# Patient Record
Sex: MALE | Race: WHITE
[De-identification: names, ages, dates, MRNs, and addresses within clinical notes are randomized per-mention and may not be internally consistent; named-entity substitution may affect disease eponyms.]

---

## 2017-02-02 ENCOUNTER — HOSPITAL ENCOUNTER (OUTPATIENT)
Dept: HOSPITAL 45 - C.LAB1850 | Age: 72
Discharge: HOME | End: 2017-02-02
Attending: INTERNAL MEDICINE
Payer: COMMERCIAL

## 2017-02-02 DIAGNOSIS — E78.00: Primary | ICD-10-CM

## 2017-02-02 LAB
ALT SERPL-CCNC: 63 U/L (ref 12–78)
ANION GAP SERPL CALC-SCNC: 9 MMOL/L (ref 3–11)
AST SERPL-CCNC: 33 U/L (ref 15–37)
BASOPHILS # BLD: 0.02 K/UL (ref 0–0.2)
BASOPHILS NFR BLD: 0.4 %
BUN SERPL-MCNC: 19 MG/DL (ref 7–18)
BUN/CREAT SERPL: 17.2 (ref 10–20)
CALCIUM SERPL-MCNC: 8.5 MG/DL (ref 8.5–10.1)
CHLORIDE SERPL-SCNC: 103 MMOL/L (ref 98–107)
CHOLEST/HDLC SERPL: 2.4 {RATIO}
CO2 SERPL-SCNC: 30 MMOL/L (ref 21–32)
COMPLETE: YES
CREAT SERPL-MCNC: 1.1 MG/DL (ref 0.6–1.4)
EOSINOPHIL NFR BLD AUTO: 220 K/UL (ref 130–400)
GLUCOSE SERPL-MCNC: 96 MG/DL (ref 70–99)
GLUCOSE UR QL: 53 MG/DL
HCT VFR BLD CALC: 44.9 % (ref 42–52)
IG%: 0 %
IMM GRANULOCYTES NFR BLD AUTO: 29.9 %
KETONES UR QL STRIP: 61 MG/DL
LYMPHOCYTES # BLD: 1.55 K/UL (ref 1.2–3.4)
MCH RBC QN AUTO: 31.5 PG (ref 25–34)
MCHC RBC AUTO-ENTMCNC: 36.1 G/DL (ref 32–36)
MCV RBC AUTO: 87.2 FL (ref 80–100)
MONOCYTES NFR BLD: 11.9 %
NEUTROPHILS # BLD AUTO: 2.5 %
NEUTROPHILS NFR BLD AUTO: 55.3 %
NITRITE UR QL STRIP: 57 MG/DL (ref 0–150)
PH UR: 125 MG/DL (ref 0–200)
PMV BLD AUTO: 10.7 FL (ref 7.4–10.4)
POTASSIUM SERPL-SCNC: 3.6 MMOL/L (ref 3.5–5.1)
RBC # BLD AUTO: 5.15 M/UL (ref 4.7–6.1)
SODIUM SERPL-SCNC: 142 MMOL/L (ref 136–145)
VERY LOW DENSITY LIPOPROT CALC: 11 MG/DL
WBC # BLD AUTO: 5.19 K/UL (ref 4.8–10.8)

## 2017-04-17 ENCOUNTER — HOSPITAL ENCOUNTER (OUTPATIENT)
Dept: HOSPITAL 45 - C.GI | Age: 72
Discharge: HOME | End: 2017-04-17
Attending: INTERNAL MEDICINE
Payer: COMMERCIAL

## 2017-04-17 VITALS
BODY MASS INDEX: 26.54 KG/M2 | HEIGHT: 70 IN | BODY MASS INDEX: 26.54 KG/M2 | BODY MASS INDEX: 26.54 KG/M2 | WEIGHT: 185.39 LBS | WEIGHT: 185.39 LBS | HEIGHT: 70 IN

## 2017-04-17 VITALS — SYSTOLIC BLOOD PRESSURE: 144 MMHG | DIASTOLIC BLOOD PRESSURE: 75 MMHG | HEART RATE: 64 BPM | OXYGEN SATURATION: 99 %

## 2017-04-17 DIAGNOSIS — K64.8: ICD-10-CM

## 2017-04-17 DIAGNOSIS — K57.30: ICD-10-CM

## 2017-04-17 DIAGNOSIS — Z86.010: ICD-10-CM

## 2017-04-17 DIAGNOSIS — E78.00: ICD-10-CM

## 2017-04-17 DIAGNOSIS — Z12.11: Primary | ICD-10-CM

## 2017-04-17 DIAGNOSIS — I10: ICD-10-CM

## 2017-04-17 NOTE — DISCHARGE INSTRUCTIONS
Endoscopy Patient Instructions


Date / Procedure(s) Performed


Apr 17, 2017.


Colonoscopy





Allergy Information


Coded Allergies:  


     Doxycycline (Verified  Allergy, Unknown, NAUSEA, 3/27/17)


     Acetaminophen (Verified  Adverse Reaction, Unknown, nausea, 3/27/17)


     Hydrocodone (Verified  Adverse Reaction, Unknown, nausea, 3/27/17)





Discharge Date / Findings


Apr 17, 2017.


Diverticulosis


Internal hemorrhoids





Medication Instructions


OK to resume all medications today as prescribed.


 Reported Home Medications








 Medications  Dose


 Route/Sig


 Max Daily Dose Days Date Category


 


 Citrucel


  (Methylcellulose


  (Laxative)) 500


 Mg Tab  1 Tab


 PO QAM


    3/27/17 Reported


 


 Testosterone


 Enanthate 200


 Mg/Ml Inj  0.4 Ml


 IM [id number]


    3/27/17 Reported


 


 Viagra


  (Sildenafil


 Citrate) 50 Mg Tab  50 Mg


 PO PRN


    2/18/16 Reported


 


 Lipitor


  (Atorvastatin


 Calcium) 20 Mg Tab  20 Mg


 PO HS


    2/18/16 Reported


 


 Flomax


  (Tamsulosin Hcl)


 0.4 Mg Cap  0.4 Mg


 PO HS


    2/18/16 Reported


 


 Lozol


  (Indapamide) 1.25


 Mg Tab  1.25 Mg


 PO QAM


    2/18/16 Reported


 


 Valium (Diazepam)


 5 Mg Tab  5 Mg


 PO HS PRN


    1/23/13 Reported











Provider Instructions





Activity Restrictions





-  No exercising or heavy lifting for 24 hours. 


-  Do not drink alcohol the day of the procedure.


-  Do not drive a car or operate machinery until the day after the procedure.


-  Do not make any important decisions or sign important papers in 24 hours 

after the procedure.





Following Day:





-  Return to full activity which may include returning to work/school.





Diet





Start your diet with liquids and light foods (jello, soup, juice, toast).  Then 

eat your usual diet if not nauseated.





Treatment For Common After Affects





For mild abdominal pain, bloating, or excessive gas:





-  Rest


-  Eat lightly


-  Lie on right side





Follow-Up Information


Follow-up with Dr Romero as scheduled





Anesthesia Information





What You Should Know





You have had a procedure that required some medicine to reduce anxiety and 

discomfort. This treatment is called moderate sedation.  


After receiving the treatment, you may be sleepy, but you will be able to 

breathe on your own.  The effects of the treatment may last for several hours.








Follow these instructions along with Activity/Diet recommendations noted above:





*  Do NOT do anything where dizziness or clumsiness would be dangerous.





*  Rest quietly at home today, then you can be up and about tomorrow.





*  Have a responsible person stay with you the rest of today.





*  You may have had an I.V. today.  If so, you may take the dressing off later 

today.





Recommendations


 


Call your doctor if:





*  Trouble breathing 





*  Continuous vomiting for more than 24 hours





*  Temperature above 101 degrees





*  Severe abdominal pain or bloating





*  Pain not relieved by pain medicine ordered





*  There is increased drainage or redness from any incision





*  A large amount of rectal bleeding greater than 2-3 tablespoons. 


   (If you had a polyp/s removed or have hemorrhoids, a small amount of blood -


    from the rectum is to be expected.)





*  You have any unanswered questions or concerns.





IN THE EVENT OF A SERIOUS EMERGENCY, GO TO THE NEAREST EMERGENCY ROOM





       Your discharge instructions were prepared by provider Barber Byrd.


 Patient Instructions Signature Page








Jett Santamaria 











Patient (or Guardian) Signature/Date:____________________________________ I 

have read and understand the instructions given to me by my caregivers.








Caregiver/RN/Doctor Signature/Date:____________________________________








The above-named patient and/or guardian has received patient instructions on 

this date.


























+  Original Patient Signature Page (only) stays with chart.  Please make copy 

for patient.

## 2017-04-17 NOTE — ANESTHESIOLOGY PROGRESS NOTE
Anesthesia Post Op Note


Date & Time


Apr 17, 2017 at 12:25





Vital Signs


Pain Intensity:  0





 Vital Signs Past 12 Hours








  Date Time  Temp Pulse Resp B/P Pulse Ox O2 Delivery O2 Flow Rate FiO2


 


4/17/17 12:08  64 20 144/75 99 Room Air  


 


4/17/17 11:50  59 20 112/55 99 Room Air  


 


4/17/17 11:37  65 20 127/58 97 Room Air  


 


4/17/17 10:43 36.6 61 20 150/82 97 Room Air  











Notes


Mental Status:  alert / awake / arousable, participated in evaluation


Pt Amnestic to Procedure:  Yes


Nausea / Vomiting:  adequately controlled


Pain:  adequately controlled


Airway Patency, RR, SpO2:  stable & adequate


BP & HR:  stable & adequate


Hydration State:  stable & adequate


Anesthetic Complications:  no major complications apparent

## 2017-04-17 NOTE — GI REPORT
Procedure Date: 4/17/2017 10:55 AM

THIS REPORT HAS BEEN AMENDED

Addendum Number: 1   Addendum Date: 4/17/2017 12:04:48 PM

     No specimens were obtained on this exam, and therefore, no pathology is 

     pending.

     Repeat colonoscopy in 5 years.

Procedure:            Colonoscopy

Indications:          High risk colon cancer surveillance: Personal history 

                      of colonic polyps

Medicines:            Monitored Anesthesia Care

Complications:        No immediate complications.

Estimated Blood Loss: Estimated blood loss: none.

Procedure:            Pre-Anesthesia Assessment:

                      - Prior to the procedure, a History and Physical was 

                      performed, and patient medications and allergies were 

                      reviewed. The patient's tolerance of previous 

                      anesthesia was also reviewed. The risks and benefits of 

                      the procedure and the sedation options and risks were 

                      discussed with the patient. All questions were 

                      answered, and informed consent was obtained. Prior 

                      Anticoagulants: The patient has taken no previous 

                      anticoagulant or antiplatelet agents. ASA Grade 

                      Assessment: II - A patient with mild systemic disease. 

                      After reviewing the risks and benefits, the patient was 

                      deemed in satisfactory condition to undergo the 

                      procedure.

                      After I obtained informed consent, the scope was passed 

                      under direct vision. Throughout the procedure, the 

                      patient's blood pressure, pulse, and oxygen saturations 

                      were monitored continuously. The scope was introduced 

                      through the anus and advanced to the terminal ileum. 

                      The colonoscopy was performed without difficulty. The 

                      patient tolerated the procedure well. The quality of 

                      the bowel preparation was good. The terminal ileum, 

                      ileocecal valve, appendiceal orifice, and rectum were 

                      photographed.

Findings:

     Multiple small-mouthed diverticula were found in the sigmoid colon.

     Non-bleeding internal hemorrhoids were found during retroflexion. The 

     hemorrhoids were small.

Impression:           - Diverticulosis in the sigmoid colon.

                      - Non-bleeding internal hemorrhoids.

                      - No specimens collected.

Recommendation:       - Resume previous diet.

                      - Continue present medications.

                      - Repeat colonoscopy for surveillance based on 

                      pathology results.

                      - Return to primary care physician as previously 

                      scheduled.

Barber Byrd DO

4/17/2017 11:33:18 AM

This report has been signed electronically.

Note Initiated On: 4/17/2017 10:55 AM

     I attest to the content of the Intraoperative Record and orders 

     documented therein, exceptions below

Barber PELAYOCaleb Byrd, DO

4/17/2017 12:05:22 PM

This report has been signed electronically.

## 2017-04-17 NOTE — ENDO HISTORY AND PHYSICAL
History & Physical


Date of Service:


Apr 17, 2017.


Chief Complaint:


Hx of polyps


Referring Physician:


Dr Romero


History of Present Illness


72 yo CM who presents for colonoscopy secondary to history of colon polyps.





Past Medical History


Arthritis, Male Genitourinary Prob., Reflux, Cancer, High Cholesterol, 

Hypertension





Past Surgical History


Hx Cardiac Surgery:  No


Hx Internal Defibrillator:  No


Hx Pacemaker:  No


Hx Abdominal Surgery:  Yes (SMALL BOWEL OBSTRUCTION REPAIR, APPY, HERNIA REPAIR)


Hx of Implantable Prosthesis:  No


Hx Post-Op Nausea and Vomiting:  No


Hx Cancer Surgery:  No


Hx Thoracic Surgery:  No


Hx Orthopedic:  Yes (LT SHOULDER SCOPE)


Hx Urinary Tract Surgery:  No





Family History


None





Social History


Smoking Status:  Never Smoker


Hx Substance Use:  No


Hx Alcohol Use:  Yes (SOCIAL)





Allergies


Coded Allergies:  


     Doxycycline (Verified  Allergy, Unknown, NAUSEA, 3/27/17)


     Acetaminophen (Verified  Adverse Reaction, Unknown, nausea, 3/27/17)


     Hydrocodone (Verified  Adverse Reaction, Unknown, nausea, 3/27/17)





Current Medications





 Reported Home Medications








 Medications  Dose


 Route/Sig


 Max Daily Dose Days Date Category


 


 Citrucel


  (Methylcellulose


  (Laxative)) 500


 Mg Tab  1 Tab


 PO QAM


    3/27/17 Reported


 


 Testosterone


 Enanthate 200


 Mg/Ml Inj  0.4 Ml


 IM [id number]


    3/27/17 Reported


 


 Viagra


  (Sildenafil


 Citrate) 50 Mg Tab  50 Mg


 PO PRN


    2/18/16 Reported


 


 Lipitor


  (Atorvastatin


 Calcium) 20 Mg Tab  20 Mg


 PO HS


    2/18/16 Reported


 


 Flomax


  (Tamsulosin Hcl)


 0.4 Mg Cap  0.4 Mg


 PO HS


    2/18/16 Reported


 


 Lozol


  (Indapamide) 1.25


 Mg Tab  1.25 Mg


 PO QAM


    2/18/16 Reported


 


 Valium (Diazepam)


 5 Mg Tab  5 Mg


 PO HS PRN


    1/23/13 Reported











Vital Signs


Weight (Kilograms):  84.09


Height (Feet):  5


Height (Inches):  10











  Date Time  Temp Pulse Resp B/P Pulse Ox O2 Delivery O2 Flow Rate FiO2


 


4/17/17 10:43 36.6 61 20 150/82 97 Room Air  











Physical Exam


General Appearance:  WD/WN, no apparent distress


Respiratory/Chest:  


   Auscultation:  breath sounds normal


Cardiovascular:  


   Heart Auscultation:  RRR


Abdomen:  


   Bowel Sounds:  normal


   Inspection & Palpation:  soft, non-distended, no tenderness, guarding & 

rebound





Assessment and Plan


Assessment:


72 yo CM who presents for colonoscopy secondary to history of colon polyps.








Plan:


Proceed with colonoscopy.

## 2017-04-21 ENCOUNTER — HOSPITAL ENCOUNTER (OUTPATIENT)
Dept: HOSPITAL 45 - C.LAB1850 | Age: 72
Discharge: HOME | End: 2017-04-21
Attending: UROLOGY
Payer: COMMERCIAL

## 2017-04-21 DIAGNOSIS — N40.1: Primary | ICD-10-CM

## 2017-08-07 ENCOUNTER — HOSPITAL ENCOUNTER (OUTPATIENT)
Dept: HOSPITAL 45 - C.RAD1850 | Age: 72
Discharge: HOME | End: 2017-08-07
Attending: PHYSICIAN ASSISTANT
Payer: COMMERCIAL

## 2017-08-07 DIAGNOSIS — R07.81: Primary | ICD-10-CM

## 2017-08-07 NOTE — DIAGNOSTIC IMAGING REPORT
LEFT RIBS UNILATERAL WITH PA CHEST



HISTORY:  72 years-old Male R07.81 Rib pain on left [id number] 



COMPARISON: Chest radiograph 3/4/2016 and 1/23/2013



TECHNIQUE: Frontal view of the chest with 4 views of the left ribs



FINDINGS: 

Slight asymmetric prominence of the left hilum is again seen, unchanged. Cardiac

silhouette is within normal limits. There is atherosclerosis of the aorta. No

pneumothorax, pleural effusion or focal airspace consolidation. Mild right

hemidiaphragmatic elevation is again seen.



No acute rib fracture is identified.



IMPRESSION: 

1. No acute cardiopulmonary process.

2. No acute rib fracture or pneumothorax identified.







The above report was generated using voice recognition software. It may contain

grammatical, syntax or spelling errors.







Electronically signed by:  Ted Jhaveri M.D.

8/7/2017 9:47 AM



Dictated Date/Time:  8/7/2017 9:45 AM

## 2017-12-07 ENCOUNTER — HOSPITAL ENCOUNTER (OUTPATIENT)
Dept: HOSPITAL 45 - C.LAB1850 | Age: 72
Discharge: HOME | End: 2017-12-07
Attending: UROLOGY
Payer: COMMERCIAL

## 2017-12-07 DIAGNOSIS — R33.9: ICD-10-CM

## 2017-12-07 DIAGNOSIS — N40.1: Primary | ICD-10-CM

## 2017-12-07 LAB
ALP SERPL-CCNC: 59 U/L (ref 45–117)
ALT SERPL-CCNC: 26 U/L (ref 12–78)
AST SERPL-CCNC: 14 U/L (ref 15–37)
EOSINOPHIL NFR BLD AUTO: 207 K/UL (ref 130–400)
HCT VFR BLD CALC: 43.4 % (ref 42–52)
MCH RBC QN AUTO: 32.3 PG (ref 25–34)
MCHC RBC AUTO-ENTMCNC: 36.6 G/DL (ref 32–36)
MCV RBC AUTO: 88 FL (ref 80–100)
PMV BLD AUTO: 10.3 FL (ref 7.4–10.4)
RBC # BLD AUTO: 4.93 M/UL (ref 4.7–6.1)
WBC # BLD AUTO: 5.63 K/UL (ref 4.8–10.8)

## 2018-01-18 ENCOUNTER — HOSPITAL ENCOUNTER (OUTPATIENT)
Dept: HOSPITAL 45 - C.LAB1850 | Age: 73
Discharge: HOME | End: 2018-01-18
Attending: INTERNAL MEDICINE
Payer: COMMERCIAL

## 2018-01-18 DIAGNOSIS — E78.00: Primary | ICD-10-CM

## 2018-01-18 LAB
ALT SERPL-CCNC: 28 U/L (ref 12–78)
AST SERPL-CCNC: 16 U/L (ref 15–37)
BASOPHILS # BLD: 0.02 K/UL (ref 0–0.2)
BASOPHILS NFR BLD: 0.4 %
BUN SERPL-MCNC: 19 MG/DL (ref 7–18)
CALCIUM SERPL-MCNC: 8.3 MG/DL (ref 8.5–10.1)
CO2 SERPL-SCNC: 33 MMOL/L (ref 21–32)
CREAT SERPL-MCNC: 1.09 MG/DL (ref 0.6–1.4)
EOS ABS #: 0.13 K/UL (ref 0–0.5)
EOSINOPHIL NFR BLD AUTO: 202 K/UL (ref 130–400)
GLUCOSE SERPL-MCNC: 99 MG/DL (ref 70–99)
HBA1C MFR BLD: 4.9 % (ref 4.5–5.6)
HCT VFR BLD CALC: 45.2 % (ref 42–52)
HGB BLD-MCNC: 16.1 G/DL (ref 14–18)
IG#: 0.01 K/UL (ref 0–0.02)
IMM GRANULOCYTES NFR BLD AUTO: 33 %
KETONES UR QL STRIP: 51 MG/DL
LYMPHOCYTES # BLD: 1.55 K/UL (ref 1.2–3.4)
MCH RBC QN AUTO: 31.5 PG (ref 25–34)
MCHC RBC AUTO-ENTMCNC: 35.6 G/DL (ref 32–36)
MCV RBC AUTO: 88.5 FL (ref 80–100)
MONO ABS #: 0.53 K/UL (ref 0.11–0.59)
MONOCYTES NFR BLD: 11.3 %
NEUT ABS #: 2.45 K/UL (ref 1.4–6.5)
NEUTROPHILS # BLD AUTO: 2.8 %
NEUTROPHILS NFR BLD AUTO: 52.3 %
PH UR: 118 MG/DL (ref 0–200)
PMV BLD AUTO: 10.2 FL (ref 7.4–10.4)
POTASSIUM SERPL-SCNC: 3.7 MMOL/L (ref 3.5–5.1)
RED CELL DISTRIBUTION WIDTH CV: 12.8 % (ref 11.5–14.5)
RED CELL DISTRIBUTION WIDTH SD: 40.9 FL (ref 36.4–46.3)
SODIUM SERPL-SCNC: 139 MMOL/L (ref 136–145)
WBC # BLD AUTO: 4.69 K/UL (ref 4.8–10.8)